# Patient Record
Sex: FEMALE | Race: BLACK OR AFRICAN AMERICAN | NOT HISPANIC OR LATINO | Employment: FULL TIME | URBAN - METROPOLITAN AREA
[De-identification: names, ages, dates, MRNs, and addresses within clinical notes are randomized per-mention and may not be internally consistent; named-entity substitution may affect disease eponyms.]

---

## 2023-05-05 DIAGNOSIS — T78.40XS ALLERGY, SEQUELA: ICD-10-CM

## 2023-05-05 RX ORDER — FLUTICASONE PROPIONATE 50 MCG
2 SPRAY, SUSPENSION (ML) NASAL DAILY
Qty: 16 G | Refills: 2 | Status: SHIPPED | OUTPATIENT
Start: 2023-05-05 | End: 2023-12-22 | Stop reason: SDUPTHER

## 2023-05-05 RX ORDER — ELECTROLYTES/DEXTROSE
SOLUTION, ORAL ORAL
COMMUNITY
End: 2023-12-22 | Stop reason: ALTCHOICE

## 2023-05-05 RX ORDER — MULTIVITAMIN
TABLET ORAL
COMMUNITY
End: 2023-12-22 | Stop reason: ALTCHOICE

## 2023-05-05 RX ORDER — CHLORPHENIRAMINE MALEATE 4 MG
1 TABLET ORAL NIGHTLY
COMMUNITY
Start: 2022-12-01 | End: 2023-12-22 | Stop reason: ALTCHOICE

## 2023-05-05 RX ORDER — FLUTICASONE PROPIONATE 50 MCG
2 SPRAY, SUSPENSION (ML) NASAL DAILY
COMMUNITY
Start: 2019-12-03 | End: 2023-05-05 | Stop reason: SDUPTHER

## 2023-05-05 RX ORDER — CALCIUM CARBONATE 600 MG
TABLET ORAL
COMMUNITY
End: 2023-12-22 | Stop reason: ALTCHOICE

## 2023-08-18 ENCOUNTER — TELEMEDICINE (OUTPATIENT)
Dept: PRIMARY CARE | Facility: CLINIC | Age: 68
End: 2023-08-18
Payer: COMMERCIAL

## 2023-08-18 DIAGNOSIS — J01.00 SUBACUTE MAXILLARY SINUSITIS: Primary | ICD-10-CM

## 2023-08-18 PROBLEM — M40.209 KYPHOSIS, ACQUIRED: Status: ACTIVE | Noted: 2023-08-18

## 2023-08-18 PROBLEM — F43.9 STRESS: Status: ACTIVE | Noted: 2023-08-18

## 2023-08-18 PROBLEM — K58.9 IRRITABLE BOWEL: Status: ACTIVE | Noted: 2023-08-18

## 2023-08-18 PROBLEM — J30.9 ALLERGIC RHINITIS WITH POSTNASAL DRIP: Status: ACTIVE | Noted: 2023-08-18

## 2023-08-18 PROBLEM — E78.00 ELEVATED LDL CHOLESTEROL LEVEL: Status: ACTIVE | Noted: 2023-08-18

## 2023-08-18 PROBLEM — R32 URINARY INCONTINENCE IN FEMALE: Status: ACTIVE | Noted: 2023-08-18

## 2023-08-18 PROBLEM — R09.82 ALLERGIC RHINITIS WITH POSTNASAL DRIP: Status: ACTIVE | Noted: 2023-08-18

## 2023-08-18 PROBLEM — R03.0 ELEVATED BP WITHOUT DIAGNOSIS OF HYPERTENSION: Status: ACTIVE | Noted: 2023-08-18

## 2023-08-18 PROCEDURE — 99442 PR PHYS/QHP TELEPHONE EVALUATION 11-20 MIN: CPT | Performed by: STUDENT IN AN ORGANIZED HEALTH CARE EDUCATION/TRAINING PROGRAM

## 2023-08-18 RX ORDER — GUAIFENESIN 400 MG/1
400 TABLET ORAL EVERY 4 HOURS PRN
Qty: 30 TABLET | Refills: 0 | Status: SHIPPED | OUTPATIENT
Start: 2023-08-18 | End: 2023-12-22 | Stop reason: ALTCHOICE

## 2023-08-18 RX ORDER — DOXYCYCLINE 100 MG/1
100 CAPSULE ORAL 2 TIMES DAILY
Qty: 14 CAPSULE | Refills: 0 | Status: SHIPPED | OUTPATIENT
Start: 2023-08-18 | End: 2023-08-25

## 2023-08-18 ASSESSMENT — ENCOUNTER SYMPTOMS
SINUS COMPLAINT: 1
SHORTNESS OF BREATH: 0
COUGH: 1
SINUS PRESSURE: 1

## 2023-08-18 NOTE — PROGRESS NOTES
An interactive audio telecommunications system which permits real-time communications between the patient (at the originating site) and provider (at the distant site) was utilized to provide this telehealth service.  Verbal consent was requested and obtained from the pt for a telehealth visit.    Subjective   Patient ID: Fanny Choudhary is a 67 y.o. female who presents for Nasal Congestion (Associated with rhinorrhea).  Sinus Problem  This is a new problem. The current episode started 1 to 4 weeks ago. The problem is unchanged. Associated symptoms include congestion, coughing, ear pain (left ear) and sinus pressure. Pertinent negatives include no shortness of breath. (Rhinorrhea) Past treatments include nasal decongestants (zyrtec).       Review of Systems   HENT:  Positive for congestion, ear pain (left ear) and sinus pressure.    Respiratory:  Positive for cough. Negative for shortness of breath.    All other systems reviewed and are negative.      There were no vitals taken for this visit.       Assessment/Plan   Problem List Items Addressed This Visit    None  Visit Diagnoses       Subacute maxillary sinusitis    -  Primary    Relevant Medications    doxycycline (Vibramycin) 100 mg capsule    guaiFENesin (Mucus Relief) 400 mg tablet

## 2023-12-22 ENCOUNTER — OFFICE VISIT (OUTPATIENT)
Dept: PRIMARY CARE | Facility: CLINIC | Age: 68
End: 2023-12-22
Payer: COMMERCIAL

## 2023-12-22 ENCOUNTER — LAB (OUTPATIENT)
Dept: LAB | Facility: LAB | Age: 68
End: 2023-12-22
Payer: COMMERCIAL

## 2023-12-22 VITALS
DIASTOLIC BLOOD PRESSURE: 78 MMHG | TEMPERATURE: 97.3 F | SYSTOLIC BLOOD PRESSURE: 114 MMHG | RESPIRATION RATE: 15 BRPM | BODY MASS INDEX: 26.87 KG/M2 | WEIGHT: 187.7 LBS | HEIGHT: 70 IN | HEART RATE: 56 BPM | OXYGEN SATURATION: 99 %

## 2023-12-22 DIAGNOSIS — H92.01 EARACHE ON RIGHT: ICD-10-CM

## 2023-12-22 DIAGNOSIS — R39.9 UTI SYMPTOMS: ICD-10-CM

## 2023-12-22 DIAGNOSIS — T78.40XS ALLERGY, SEQUELA: ICD-10-CM

## 2023-12-22 DIAGNOSIS — R39.9 UTI SYMPTOMS: Primary | ICD-10-CM

## 2023-12-22 PROBLEM — M41.20 IDIOPATHIC SCOLIOSIS AND KYPHOSCOLIOSIS: Status: ACTIVE | Noted: 2023-12-22

## 2023-12-22 LAB
ALBUMIN SERPL BCP-MCNC: 4.3 G/DL (ref 3.4–5)
ALP SERPL-CCNC: 72 U/L (ref 33–136)
ALT SERPL W P-5'-P-CCNC: 13 U/L (ref 7–45)
ANION GAP SERPL CALC-SCNC: 13 MMOL/L (ref 10–20)
APPEARANCE UR: CLEAR
AST SERPL W P-5'-P-CCNC: 17 U/L (ref 9–39)
BILIRUB SERPL-MCNC: 0.6 MG/DL (ref 0–1.2)
BILIRUB UR STRIP.AUTO-MCNC: NEGATIVE MG/DL
BUN SERPL-MCNC: 15 MG/DL (ref 6–23)
CALCIUM SERPL-MCNC: 9.4 MG/DL (ref 8.6–10.6)
CHLORIDE SERPL-SCNC: 106 MMOL/L (ref 98–107)
CO2 SERPL-SCNC: 27 MMOL/L (ref 21–32)
COLOR UR: YELLOW
CREAT SERPL-MCNC: 0.88 MG/DL (ref 0.5–1.05)
GFR SERPL CREATININE-BSD FRML MDRD: 73 ML/MIN/1.73M*2
GLUCOSE SERPL-MCNC: 98 MG/DL (ref 74–99)
GLUCOSE UR STRIP.AUTO-MCNC: NEGATIVE MG/DL
KETONES UR STRIP.AUTO-MCNC: NEGATIVE MG/DL
LEUKOCYTE ESTERASE UR QL STRIP.AUTO: NEGATIVE
NITRITE UR QL STRIP.AUTO: NEGATIVE
PH UR STRIP.AUTO: 6 [PH]
POTASSIUM SERPL-SCNC: 4.4 MMOL/L (ref 3.5–5.3)
PROT SERPL-MCNC: 7.2 G/DL (ref 6.4–8.2)
PROT UR STRIP.AUTO-MCNC: NEGATIVE MG/DL
RBC # UR STRIP.AUTO: NEGATIVE /UL
SODIUM SERPL-SCNC: 142 MMOL/L (ref 136–145)
SP GR UR STRIP.AUTO: 1.02
UROBILINOGEN UR STRIP.AUTO-MCNC: 2 MG/DL

## 2023-12-22 PROCEDURE — 99214 OFFICE O/P EST MOD 30 MIN: CPT | Performed by: STUDENT IN AN ORGANIZED HEALTH CARE EDUCATION/TRAINING PROGRAM

## 2023-12-22 PROCEDURE — 1160F RVW MEDS BY RX/DR IN RCRD: CPT | Performed by: STUDENT IN AN ORGANIZED HEALTH CARE EDUCATION/TRAINING PROGRAM

## 2023-12-22 PROCEDURE — 81003 URINALYSIS AUTO W/O SCOPE: CPT

## 2023-12-22 PROCEDURE — 1126F AMNT PAIN NOTED NONE PRSNT: CPT | Performed by: STUDENT IN AN ORGANIZED HEALTH CARE EDUCATION/TRAINING PROGRAM

## 2023-12-22 PROCEDURE — 36415 COLL VENOUS BLD VENIPUNCTURE: CPT

## 2023-12-22 PROCEDURE — 80053 COMPREHEN METABOLIC PANEL: CPT

## 2023-12-22 PROCEDURE — 1159F MED LIST DOCD IN RCRD: CPT | Performed by: STUDENT IN AN ORGANIZED HEALTH CARE EDUCATION/TRAINING PROGRAM

## 2023-12-22 PROCEDURE — 1036F TOBACCO NON-USER: CPT | Performed by: STUDENT IN AN ORGANIZED HEALTH CARE EDUCATION/TRAINING PROGRAM

## 2023-12-22 PROCEDURE — 87086 URINE CULTURE/COLONY COUNT: CPT

## 2023-12-22 RX ORDER — FLUTICASONE PROPIONATE 50 MCG
2 SPRAY, SUSPENSION (ML) NASAL DAILY
Qty: 16 G | Refills: 2 | Status: SHIPPED | OUTPATIENT
Start: 2023-12-22

## 2023-12-22 RX ORDER — SULFAMETHOXAZOLE AND TRIMETHOPRIM 800; 160 MG/1; MG/1
1 TABLET ORAL 2 TIMES DAILY
Qty: 14 TABLET | Refills: 0 | Status: SHIPPED | OUTPATIENT
Start: 2023-12-22 | End: 2023-12-29

## 2023-12-22 ASSESSMENT — LIFESTYLE VARIABLES
HOW OFTEN DO YOU HAVE SIX OR MORE DRINKS ON ONE OCCASION: NEVER
HOW OFTEN DO YOU HAVE A DRINK CONTAINING ALCOHOL: NEVER
SKIP TO QUESTIONS 9-10: 1
HOW MANY STANDARD DRINKS CONTAINING ALCOHOL DO YOU HAVE ON A TYPICAL DAY: PATIENT DOES NOT DRINK
AUDIT-C TOTAL SCORE: 0

## 2023-12-22 ASSESSMENT — ENCOUNTER SYMPTOMS
RHINORRHEA: 1
HEMATURIA: 0
FREQUENCY: 1
FLANK PAIN: 0
CHILLS: 0
NAUSEA: 0
VOMITING: 0
COUGH: 0
HEADACHES: 0

## 2023-12-22 ASSESSMENT — PATIENT HEALTH QUESTIONNAIRE - PHQ9
1. LITTLE INTEREST OR PLEASURE IN DOING THINGS: NOT AT ALL
2. FEELING DOWN, DEPRESSED OR HOPELESS: NOT AT ALL
SUM OF ALL RESPONSES TO PHQ9 QUESTIONS 1 & 2: 0

## 2023-12-22 NOTE — PATIENT INSTRUCTIONS
Bactrim twice daily for 7 days  Blood and urine work ordered  We discussed about using Debrox to clean the earwax and reevaluating the ear for possible ear infection.  Use the Flonase more consistently  You can also use Zyrtec or Claritin for your congestion.  Follow-up with me in 4 to 6 months or as needed

## 2023-12-22 NOTE — PROGRESS NOTES
Subjective   Patient ID: Fanny Choudhary is a pleasant 68 y.o. female who presents for right ear pain.  Earache   There is pain in the right ear. This is a new problem. The current episode started 1 to 4 weeks ago. The problem has been unchanged. There has been no fever. Associated symptoms include rhinorrhea. Pertinent negatives include no coughing, ear discharge, headaches, hearing loss or vomiting. She has tried nothing for the symptoms.   UTI   This is a new problem. The current episode started 1 to 4 weeks ago (2 weeks). The problem occurs every urination. Associated symptoms include frequency. Pertinent negatives include no chills, flank pain, hematuria, hesitancy, nausea, urgency or vomiting. Associated symptoms comments: Strong odor . She has tried nothing for the symptoms. There is no history of recurrent UTIs.       Review of Systems   Constitutional:  Negative for chills.   HENT:  Positive for ear pain and rhinorrhea. Negative for ear discharge and hearing loss.    Respiratory:  Negative for cough.    Gastrointestinal:  Negative for nausea and vomiting.   Genitourinary:  Positive for frequency. Negative for flank pain, hematuria, hesitancy and urgency.   Neurological:  Negative for headaches.   All other systems reviewed and are negative.      Visit Vitals  /78   Pulse 56   Temp 36.3 °C (97.3 °F)   Resp 15          Objective   Physical Exam  Constitutional:       General: She is not in acute distress.     Appearance: Normal appearance.   HENT:      Head: Normocephalic and atraumatic.      Right Ear: There is impacted cerumen.      Left Ear: There is impacted cerumen.   Eyes:      General: No scleral icterus.     Conjunctiva/sclera: Conjunctivae normal.   Cardiovascular:      Rate and Rhythm: Normal rate and regular rhythm.      Heart sounds: Normal heart sounds.   Pulmonary:      Effort: Pulmonary effort is normal.      Breath sounds: Normal breath sounds. No wheezing.   Abdominal:      General:  Bowel sounds are normal. There is no distension.      Palpations: Abdomen is soft.      Tenderness: There is no abdominal tenderness.   Musculoskeletal:      Cervical back: Neck supple.      Right lower leg: No edema.      Left lower leg: No edema.   Lymphadenopathy:      Cervical: No cervical adenopathy.   Skin:     General: Skin is warm and dry.   Neurological:      General: No focal deficit present.      Mental Status: She is alert and oriented to person, place, and time.   Psychiatric:         Mood and Affect: Mood normal.         Behavior: Behavior normal.         Assessment/Plan   Problem List Items Addressed This Visit    None  Visit Diagnoses       UTI symptoms    -  Primary    Relevant Medications    sulfamethoxazole-trimethoprim (Bactrim DS) 800-160 mg tablet    Other Relevant Orders    Urinalysis with Reflex Microscopic    Urine Culture    Comprehensive Metabolic Panel    Earache on right      Unable to determine if there is a otitis media due to cerumen impaction.  She will use [facility]x [facility] and to be further evaluated.    Allergy, sequela        Relevant Medications    fluticasone (Flonase) 50 mcg/actuation nasal spray

## 2023-12-23 LAB — BACTERIA UR CULT: NO GROWTH

## 2024-03-15 ENCOUNTER — LAB (OUTPATIENT)
Dept: LAB | Facility: LAB | Age: 69
End: 2024-03-15
Payer: COMMERCIAL

## 2024-03-15 ENCOUNTER — OFFICE VISIT (OUTPATIENT)
Dept: PRIMARY CARE | Facility: CLINIC | Age: 69
End: 2024-03-15
Payer: COMMERCIAL

## 2024-03-15 VITALS
DIASTOLIC BLOOD PRESSURE: 78 MMHG | WEIGHT: 178.5 LBS | SYSTOLIC BLOOD PRESSURE: 122 MMHG | BODY MASS INDEX: 25.56 KG/M2 | OXYGEN SATURATION: 99 % | HEIGHT: 70 IN | RESPIRATION RATE: 14 BRPM | HEART RATE: 60 BPM | TEMPERATURE: 96.8 F

## 2024-03-15 DIAGNOSIS — Z13.1 DIABETES MELLITUS SCREENING: ICD-10-CM

## 2024-03-15 DIAGNOSIS — E78.5 HYPERLIPIDEMIA, UNSPECIFIED HYPERLIPIDEMIA TYPE: ICD-10-CM

## 2024-03-15 DIAGNOSIS — H53.9 VISION CHANGES: ICD-10-CM

## 2024-03-15 DIAGNOSIS — Z23 NEED FOR SHINGLES VACCINE: ICD-10-CM

## 2024-03-15 DIAGNOSIS — L65.9 HAIR LOSS: ICD-10-CM

## 2024-03-15 DIAGNOSIS — H61.23 BILATERAL IMPACTED CERUMEN: ICD-10-CM

## 2024-03-15 DIAGNOSIS — Z12.31 BREAST CANCER SCREENING BY MAMMOGRAM: ICD-10-CM

## 2024-03-15 DIAGNOSIS — Z00.00 ANNUAL PHYSICAL EXAM: Primary | ICD-10-CM

## 2024-03-15 LAB
ALBUMIN SERPL BCP-MCNC: 4.2 G/DL (ref 3.4–5)
ALP SERPL-CCNC: 83 U/L (ref 33–136)
ALT SERPL W P-5'-P-CCNC: 16 U/L (ref 7–45)
ANION GAP SERPL CALC-SCNC: 10 MMOL/L (ref 10–20)
APPEARANCE UR: CLEAR
AST SERPL W P-5'-P-CCNC: 16 U/L (ref 9–39)
BILIRUB SERPL-MCNC: 0.5 MG/DL (ref 0–1.2)
BILIRUB UR STRIP.AUTO-MCNC: NEGATIVE MG/DL
BUN SERPL-MCNC: 16 MG/DL (ref 6–23)
CALCIUM SERPL-MCNC: 9.4 MG/DL (ref 8.6–10.6)
CHLORIDE SERPL-SCNC: 105 MMOL/L (ref 98–107)
CHOLEST SERPL-MCNC: 263 MG/DL (ref 0–199)
CHOLESTEROL/HDL RATIO: 2.9
CO2 SERPL-SCNC: 30 MMOL/L (ref 21–32)
COLOR UR: YELLOW
CREAT SERPL-MCNC: 0.94 MG/DL (ref 0.5–1.05)
EGFRCR SERPLBLD CKD-EPI 2021: 67 ML/MIN/1.73M*2
EST. AVERAGE GLUCOSE BLD GHB EST-MCNC: 114 MG/DL
GLUCOSE SERPL-MCNC: 87 MG/DL (ref 74–99)
GLUCOSE UR STRIP.AUTO-MCNC: NORMAL MG/DL
HBA1C MFR BLD: 5.6 %
HDLC SERPL-MCNC: 91.1 MG/DL
KETONES UR STRIP.AUTO-MCNC: NEGATIVE MG/DL
LDLC SERPL CALC-MCNC: 160 MG/DL
LEUKOCYTE ESTERASE UR QL STRIP.AUTO: NEGATIVE
NITRITE UR QL STRIP.AUTO: NEGATIVE
NON HDL CHOLESTEROL: 172 MG/DL (ref 0–149)
PH UR STRIP.AUTO: 6.5 [PH]
POTASSIUM SERPL-SCNC: 4.3 MMOL/L (ref 3.5–5.3)
PROT SERPL-MCNC: 6.8 G/DL (ref 6.4–8.2)
PROT UR STRIP.AUTO-MCNC: NORMAL MG/DL
RBC # UR STRIP.AUTO: NEGATIVE /UL
RBC #/AREA URNS AUTO: NORMAL /HPF
SODIUM SERPL-SCNC: 141 MMOL/L (ref 136–145)
SP GR UR STRIP.AUTO: 1.03
TRIGL SERPL-MCNC: 58 MG/DL (ref 0–149)
UROBILINOGEN UR STRIP.AUTO-MCNC: NORMAL MG/DL
VLDL: 12 MG/DL (ref 0–40)
WBC #/AREA URNS AUTO: NORMAL /HPF

## 2024-03-15 PROCEDURE — 90750 HZV VACC RECOMBINANT IM: CPT | Performed by: STUDENT IN AN ORGANIZED HEALTH CARE EDUCATION/TRAINING PROGRAM

## 2024-03-15 PROCEDURE — 80061 LIPID PANEL: CPT

## 2024-03-15 PROCEDURE — 1036F TOBACCO NON-USER: CPT | Performed by: STUDENT IN AN ORGANIZED HEALTH CARE EDUCATION/TRAINING PROGRAM

## 2024-03-15 PROCEDURE — 83036 HEMOGLOBIN GLYCOSYLATED A1C: CPT

## 2024-03-15 PROCEDURE — 81001 URINALYSIS AUTO W/SCOPE: CPT

## 2024-03-15 PROCEDURE — 36415 COLL VENOUS BLD VENIPUNCTURE: CPT

## 2024-03-15 PROCEDURE — 99397 PER PM REEVAL EST PAT 65+ YR: CPT | Performed by: STUDENT IN AN ORGANIZED HEALTH CARE EDUCATION/TRAINING PROGRAM

## 2024-03-15 PROCEDURE — 1159F MED LIST DOCD IN RCRD: CPT | Performed by: STUDENT IN AN ORGANIZED HEALTH CARE EDUCATION/TRAINING PROGRAM

## 2024-03-15 PROCEDURE — 90471 IMMUNIZATION ADMIN: CPT | Performed by: STUDENT IN AN ORGANIZED HEALTH CARE EDUCATION/TRAINING PROGRAM

## 2024-03-15 PROCEDURE — 1160F RVW MEDS BY RX/DR IN RCRD: CPT | Performed by: STUDENT IN AN ORGANIZED HEALTH CARE EDUCATION/TRAINING PROGRAM

## 2024-03-15 PROCEDURE — 80053 COMPREHEN METABOLIC PANEL: CPT

## 2024-03-15 ASSESSMENT — PATIENT HEALTH QUESTIONNAIRE - PHQ9
SUM OF ALL RESPONSES TO PHQ9 QUESTIONS 1 AND 2: 0
1. LITTLE INTEREST OR PLEASURE IN DOING THINGS: NOT AT ALL
2. FEELING DOWN, DEPRESSED OR HOPELESS: NOT AT ALL

## 2024-03-15 NOTE — PROGRESS NOTES
Subjective   Patient ID: Fanny Choudhary is a 68 y.o. female who presents for Annual Exam (physical).  HPI    Health Maintenance:  -   Colonoscopy: 2022, repeat in 5 years   -   Mammogram: 4/13/23  -   PAP: 2021  -   Bone density DEXA: 5/23, osteopenia     Immunizations:  - COVID vaccination status:  - Influenza:  - Shingles: due   - TDAP: 2017  - Pneumo Vaccine: declined     She is due for annual eye exam   UTD w dental exam  She is still working and taking care of 2 family members. She is physically active.   She continues to have itching in the right ear.  Noted to have bilateral cerumen impaction.      Review of Systems   All other systems reviewed and are negative.      Visit Vitals  /78 (Patient Position: Sitting)   Pulse 60   Temp 36 °C (96.8 °F)   Resp 14          Objective   Physical Exam  Constitutional:       General: She is not in acute distress.     Appearance: Normal appearance.   HENT:      Head: Normocephalic and atraumatic.      Right Ear: There is impacted cerumen.      Left Ear: There is impacted cerumen.   Eyes:      General: No scleral icterus.     Conjunctiva/sclera: Conjunctivae normal.   Cardiovascular:      Rate and Rhythm: Normal rate and regular rhythm.      Heart sounds: Normal heart sounds.   Pulmonary:      Effort: Pulmonary effort is normal.      Breath sounds: Normal breath sounds. No wheezing.   Abdominal:      General: Bowel sounds are normal. There is no distension.      Palpations: Abdomen is soft.      Tenderness: There is no abdominal tenderness.   Musculoskeletal:      Cervical back: Neck supple.      Right lower leg: No edema.      Left lower leg: No edema.   Lymphadenopathy:      Cervical: No cervical adenopathy.   Skin:     General: Skin is warm and dry.   Neurological:      General: No focal deficit present.      Mental Status: She is alert and oriented to person, place, and time.   Psychiatric:         Mood and Affect: Mood normal.         Behavior: Behavior  normal.         Assessment/Plan   Problem List Items Addressed This Visit    None  Visit Diagnoses       Annual physical exam    -  Primary    Breast cancer screening by mammogram        Relevant Orders    BI mammo bilateral screening tomosynthesis    Hyperlipidemia, unspecified hyperlipidemia type        Relevant Orders    Lipid Panel    Comprehensive Metabolic Panel    Need for shingles vaccine        Relevant Orders    Zoster vaccine, recombinant, adult (SHINGRIX) (Completed)    Vision changes        Relevant Orders    Referral to Ophthalmology    Hair loss        Relevant Orders    Referral to Dermatology    Bilateral impacted cerumen        Relevant Orders    Referral to ENT    Diabetes mellitus screening        Relevant Orders    Hemoglobin A1C    Urinalysis with Reflex Microscopic

## 2024-03-15 NOTE — PATIENT INSTRUCTIONS
Schedule your eye exam   Shingle vaccine today   Schedule your mammogram next month   Continue with current medications.  Blood work before your next visit.  If you receive medical information from [facility], your results will be released into your online chart. This means you may view or see results before someone from our office contact you directly.  Please keep in mind that if blood work or imaging were ordered during your visit, all the nonurgent lab results will be discussed with you at your next office visit.  Please arrive 15 minutes before your appointment.   Follow-up with primary care in 12 months or as needed

## 2024-11-08 ENCOUNTER — OFFICE VISIT (OUTPATIENT)
Dept: URGENT CARE | Age: 69
End: 2024-11-08
Payer: COMMERCIAL

## 2024-11-08 VITALS
HEART RATE: 82 BPM | OXYGEN SATURATION: 99 % | DIASTOLIC BLOOD PRESSURE: 76 MMHG | WEIGHT: 188 LBS | TEMPERATURE: 98.2 F | BODY MASS INDEX: 27.36 KG/M2 | SYSTOLIC BLOOD PRESSURE: 110 MMHG | RESPIRATION RATE: 18 BRPM

## 2024-11-08 DIAGNOSIS — R05.1 ACUTE COUGH: Primary | ICD-10-CM

## 2024-11-08 DIAGNOSIS — M54.6 ACUTE RIGHT-SIDED THORACIC BACK PAIN: ICD-10-CM

## 2024-11-08 ASSESSMENT — ENCOUNTER SYMPTOMS: BACK PAIN: 1

## 2024-11-08 ASSESSMENT — PAIN SCALES - GENERAL: PAINLEVEL_OUTOF10: 8

## 2024-11-09 ENCOUNTER — ANCILLARY PROCEDURE (OUTPATIENT)
Dept: URGENT CARE | Age: 69
End: 2024-11-09
Payer: COMMERCIAL

## 2024-11-09 ENCOUNTER — TELEPHONE (OUTPATIENT)
Dept: URGENT CARE | Age: 69
End: 2024-11-09

## 2024-11-09 DIAGNOSIS — M54.6 ACUTE RIGHT-SIDED THORACIC BACK PAIN: ICD-10-CM

## 2024-11-09 DIAGNOSIS — R05.1 ACUTE COUGH: ICD-10-CM

## 2024-11-09 PROCEDURE — 71046 X-RAY EXAM CHEST 2 VIEWS: CPT | Performed by: PHYSICIAN ASSISTANT

## 2024-11-09 NOTE — PROGRESS NOTES
Subjective   Patient ID: Fanny Choudhary is a 69 y.o. female. They present today with a chief complaint of Severe pain and Back Pain.    History of Present Illness    Back Pain      69-year-old female with no significant past medical history presents to urgent care with her  for evaluation of right sided thoracic back pain.  Patient denies any specific injuries but reports the pain began shortly after picking up her briefcase earlier today.  Pain is worse with range of motion of her right shoulder as well as with bending and twisting activities.  Reports shortness of breath when the pain occurs but otherwise she has no shortness of breath.  No fevers but she does report that a cough developed today.  No sore throat or ear pain.  She does report multiple contacts at work that are sick.  She has taken over-the-counter Motrin as well as turmeric and curcumin with mild temporary relief in her pain.  She denies any associated chest pain.  No swelling in her legs.  Denies history of blood clots.  No recent immobilization.  Patient does not take any estrogen containing products.  No hemoptysis.  Past Medical History  Allergies as of 11/08/2024 - Reviewed 11/08/2024   Allergen Reaction Noted    Animal dander Unknown 12/22/2023    Bee pollen Unknown 12/22/2023    House dust Unknown 12/22/2023    Penicillins Unknown 08/18/2023       (Not in a hospital admission)       Past Medical History:   Diagnosis Date    Allergic rhinitis due to pollen     Allergic rhinitis due to pollen    Benign neoplasm of colon, unspecified 11/07/2017    Tubular adenoma of colon    Chondrocostal junction syndrome (tietze) 02/04/2016    Costochondritis    Concussion with loss of consciousness status unknown, initial encounter 01/07/2020    Concussion    Contusion of unspecified part of head, initial encounter 01/14/2015    Contusion of head    Encounter for gynecological examination (general) (routine) without abnormal findings 12/07/2017     Encounter for annual routine gynecological examination    Endometriosis, unspecified     Endometriosis    Generalized abdominal pain 12/22/2016    Abdominal pain, diffuse    Other chest pain 04/02/2018    Chest pain, atypical    Other conditions influencing health status     Mammogram    Other conditions influencing health status 03/24/2017    History of cough    Pain in left shoulder 02/04/2016    Left shoulder pain    Personal history of other diseases of the female genital tract 01/14/2015    History of breast pain    Personal history of other diseases of the nervous system and sense organs 07/13/2015    History of otitis media    Personal history of other diseases of the nervous system and sense organs 03/17/2015    History of acute otitis media    Personal history of other diseases of the respiratory system     History of extrinsic asthma    Personal history of other diseases of the respiratory system     History of bronchitis    Personal history of other diseases of the respiratory system     History of chronic sinusitis    Personal history of other specified conditions 12/08/2016    History of dysuria    Personal history of other specified conditions 09/25/2017    History of abdominal pain    Personal history of pneumonia (recurrent)     History of pneumonia    Unspecified acute noninfective otitis externa, right ear 06/14/2016    Acute otitis externa of right ear, unspecified type    Unspecified fracture of unspecified toe(s), initial encounter for closed fracture 06/19/2014    Closed fracture of phalanx of foot    Unspecified injury of unspecified foot, initial encounter 03/27/2014    Foot trauma       Past Surgical History:   Procedure Laterality Date    COLONOSCOPY  03/03/2014    Complete Colonoscopy    OTHER SURGICAL HISTORY  07/13/2015    Laparoscopy Of Uterus        reports that she has never smoked. She has never used smokeless tobacco. She reports that she does not drink alcohol and does not use  drugs.    Review of Systems  Review of Systems   Musculoskeletal:  Positive for back pain.   Allergic/Immunologic: Negative for immunocompromised state.   All other systems reviewed and are negative.                                 Objective    Vitals:    11/08/24 1916   BP: 110/76   BP Location: Left arm   Patient Position: Sitting   Pulse: 82   Resp: 18   Temp: 36.8 °C (98.2 °F)   TempSrc: Oral   SpO2: 99%   Weight: 85.3 kg (188 lb)     No LMP recorded.    Physical Exam  Vitals reviewed.   Constitutional:       Appearance: Normal appearance.   Cardiovascular:      Rate and Rhythm: Normal rate and regular rhythm.      Heart sounds: Normal heart sounds.   Pulmonary:      Effort: Pulmonary effort is normal.      Breath sounds: Normal breath sounds.   Musculoskeletal:      Cervical back: Normal.      Thoracic back: Tenderness (Right side of the thoracic back around the scapula.) present.      Comments: No bruises, abrasions, rashes, or other overlying skin abnormalities.   Skin:     General: Skin is warm and dry.   Neurological:      Mental Status: She is alert and oriented to person, place, and time.   Psychiatric:         Mood and Affect: Mood normal.         Behavior: Behavior normal.         Procedures    Point of Care Test & Imaging Results from this visit  No results found for this visit on 11/08/24.   No results found.    Diagnostic study results (if any) were reviewed by Paras Beltran PA-C.    Assessment/Plan   Allergies, medications, history, and pertinent labs/EKGs/Imaging reviewed by Paras Beltran PA-C.     Medical Decision Making  Patient is afebrile with normal vital signs.  She is in no acute distress.  Physical exam is benign aside from reproducible tenderness around the right scapula.  She has no overlying skin changes.  No hypoxia or tachycardia.  She only has shortness of breath with episodes of pain which are elicited with palpation or twisting or bending activities.  I have low suspicion for PE  at this time although I did inform patient that if she has any worsening symptoms she needs to go to the ED to be evaluated for PE.  Lungs are clear to auscultation although she does report that she developed a mild cough today.  Chest x-ray is ordered and she will return to clinic tomorrow for imaging.  In the meantime she will use over-the-counter pain medications.  I offered to send in prescriptions for pain medicine but she declined.    Orders and Diagnoses  Diagnoses and all orders for this visit:  Acute cough  -     XR chest 2 views; Future  Acute right-sided thoracic back pain  -     XR chest 2 views; Future      Medical Admin Record      Patient disposition: Home    Electronically signed by Paras Beltran PA-C  7:23 PM

## 2024-11-09 NOTE — PROGRESS NOTES
11/09/24    Fanny Choudhary  YOB: 1955     To Whom It May Concern:    Fanny Choudhary was seen in my clinic on 11/9/2024 at 8:30 am. Please excuse Fanny for her absence from school on this day to make the appointment.    If you have any questions or concerns, please don't hesitate to call.    According to the [employer] guidelines, endocarditis prophylaxis at times of increased risks is not indicated.     [unfilled]    Sincerely,         [facility]        CC: [unfilled]

## 2024-12-13 ENCOUNTER — HOSPITAL ENCOUNTER (OUTPATIENT)
Dept: RADIOLOGY | Facility: CLINIC | Age: 69
Discharge: HOME | End: 2024-12-13
Payer: COMMERCIAL

## 2024-12-13 DIAGNOSIS — Z12.31 BREAST CANCER SCREENING BY MAMMOGRAM: ICD-10-CM

## 2024-12-13 PROCEDURE — 77067 SCR MAMMO BI INCL CAD: CPT

## 2025-01-27 ENCOUNTER — ANCILLARY PROCEDURE (OUTPATIENT)
Dept: URGENT CARE | Age: 70
End: 2025-01-27
Payer: COMMERCIAL

## 2025-01-27 ENCOUNTER — OFFICE VISIT (OUTPATIENT)
Dept: URGENT CARE | Age: 70
End: 2025-01-27
Payer: COMMERCIAL

## 2025-01-27 VITALS
SYSTOLIC BLOOD PRESSURE: 158 MMHG | HEART RATE: 61 BPM | BODY MASS INDEX: 27.22 KG/M2 | OXYGEN SATURATION: 95 % | DIASTOLIC BLOOD PRESSURE: 112 MMHG | WEIGHT: 187 LBS

## 2025-01-27 DIAGNOSIS — R09.89 CHEST CONGESTION: ICD-10-CM

## 2025-01-27 DIAGNOSIS — R05.1 ACUTE COUGH: Primary | ICD-10-CM

## 2025-01-27 DIAGNOSIS — R03.0 ELEVATED BP WITHOUT DIAGNOSIS OF HYPERTENSION: ICD-10-CM

## 2025-01-27 DIAGNOSIS — R05.1 ACUTE COUGH: ICD-10-CM

## 2025-01-27 PROCEDURE — 1036F TOBACCO NON-USER: CPT | Performed by: PHYSICIAN ASSISTANT

## 2025-01-27 PROCEDURE — 1159F MED LIST DOCD IN RCRD: CPT | Performed by: PHYSICIAN ASSISTANT

## 2025-01-27 PROCEDURE — 99214 OFFICE O/P EST MOD 30 MIN: CPT | Performed by: PHYSICIAN ASSISTANT

## 2025-01-27 PROCEDURE — 71046 X-RAY EXAM CHEST 2 VIEWS: CPT | Performed by: PHYSICIAN ASSISTANT

## 2025-01-27 RX ORDER — AZITHROMYCIN 250 MG/1
250 TABLET, FILM COATED ORAL DAILY
Qty: 6 TABLET | Refills: 0 | Status: SHIPPED | OUTPATIENT
Start: 2025-01-27 | End: 2025-02-02

## 2025-01-27 ASSESSMENT — ENCOUNTER SYMPTOMS: COUGH: 1

## 2025-01-27 NOTE — PROGRESS NOTES
Subjective   Patient ID: Fanny Choudhary is a 69 y.o. female. They present today with a chief complaint of Cough (Clear phlegm, x3wks). Taking otc meds without improvement.  Patient reports that she is not coughing anything up.  Denies chest pain, shortness of breath, nausea, vomiting, lightness, dizziness, palpitations, syncope.        Past Medical History  Allergies as of 01/27/2025 - Reviewed 01/27/2025   Allergen Reaction Noted    Animal dander Unknown 12/22/2023    Bee pollen Unknown 12/22/2023    House dust Unknown 12/22/2023    Penicillins Unknown 08/18/2023       (Not in a hospital admission)       Past Medical History:   Diagnosis Date    Allergic rhinitis due to pollen     Allergic rhinitis due to pollen    Benign neoplasm of colon, unspecified 11/07/2017    Tubular adenoma of colon    Chondrocostal junction syndrome (tietze) 02/04/2016    Costochondritis    Concussion with loss of consciousness status unknown, initial encounter 01/07/2020    Concussion    Contusion of unspecified part of head, initial encounter 01/14/2015    Contusion of head    Encounter for gynecological examination (general) (routine) without abnormal findings 12/07/2017    Encounter for annual routine gynecological examination    Endometriosis, unspecified     Endometriosis    Generalized abdominal pain 12/22/2016    Abdominal pain, diffuse    Other chest pain 04/02/2018    Chest pain, atypical    Other conditions influencing health status     Mammogram    Other conditions influencing health status 03/24/2017    History of cough    Pain in left shoulder 02/04/2016    Left shoulder pain    Personal history of other diseases of the female genital tract 01/14/2015    History of breast pain    Personal history of other diseases of the nervous system and sense organs 07/13/2015    History of otitis media    Personal history of other diseases of the nervous system and sense organs 03/17/2015    History of acute otitis media    Personal  history of other diseases of the respiratory system     History of extrinsic asthma    Personal history of other diseases of the respiratory system     History of bronchitis    Personal history of other diseases of the respiratory system     History of chronic sinusitis    Personal history of other specified conditions 12/08/2016    History of dysuria    Personal history of other specified conditions 09/25/2017    History of abdominal pain    Personal history of pneumonia (recurrent)     History of pneumonia    Unspecified acute noninfective otitis externa, right ear 06/14/2016    Acute otitis externa of right ear, unspecified type    Unspecified fracture of unspecified toe(s), initial encounter for closed fracture 06/19/2014    Closed fracture of phalanx of foot    Unspecified injury of unspecified foot, initial encounter 03/27/2014    Foot trauma       Past Surgical History:   Procedure Laterality Date    COLONOSCOPY  03/03/2014    Complete Colonoscopy    OTHER SURGICAL HISTORY  07/13/2015    Laparoscopy Of Uterus        reports that she has never smoked. She has never used smokeless tobacco. She reports that she does not drink alcohol and does not use drugs.    Review of Systems  Review of Systems   Respiratory:  Positive for cough.    All other systems reviewed and are negative.                                 Objective    Vitals:    01/27/25 1833   BP: (!) 158/112   Pulse: 61   SpO2: 95%   Weight: 84.8 kg (187 lb)     No LMP recorded. Patient is postmenopausal.    Physical Exam  Vitals and nursing note reviewed.   Constitutional:       Appearance: Normal appearance.   Eyes:      Conjunctiva/sclera: Conjunctivae normal.   Cardiovascular:      Rate and Rhythm: Normal rate.   Pulmonary:      Effort: Pulmonary effort is normal.   Neurological:      Mental Status: She is alert.   Psychiatric:         Mood and Affect: Mood normal.         Procedures    Point of Care Test & Imaging Results from this visit  No results  found for this visit on 01/27/25.   No results found.    Diagnostic study results (if any) were reviewed by Brandon Mac PA-C.    Assessment/Plan   Allergies, medications, history, and pertinent labs/EKGs/Imaging reviewed by Brandon Mac PA-C.     Medical Decision Making  Cough for several weeks without improvement with over-the-counter measures.  Feels like she has chest congestion.  CXR without consolidation, mass, effusion  Continue supportive measures, follow up with pcp  Azithromycin if develop fever.   Orders and Diagnoses  Diagnoses and all orders for this visit:  Acute cough  -     XR chest 2 views; Future  Chest congestion  -     XR chest 2 views; Future  Elevated BP without diagnosis of hypertension      Medical Admin Record      Patient disposition: Home    Electronically signed by Brandon Mac PA-C  6:42 PM

## 2025-02-03 ENCOUNTER — OFFICE VISIT (OUTPATIENT)
Dept: URGENT CARE | Age: 70
End: 2025-02-03
Payer: COMMERCIAL

## 2025-02-03 VITALS
SYSTOLIC BLOOD PRESSURE: 150 MMHG | TEMPERATURE: 98.7 F | OXYGEN SATURATION: 97 % | DIASTOLIC BLOOD PRESSURE: 87 MMHG | HEART RATE: 71 BPM

## 2025-02-03 DIAGNOSIS — J01.00 ACUTE NON-RECURRENT MAXILLARY SINUSITIS: Primary | ICD-10-CM

## 2025-02-03 RX ORDER — AMOXICILLIN AND CLAVULANATE POTASSIUM 875; 125 MG/1; MG/1
1 TABLET, FILM COATED ORAL 2 TIMES DAILY
Qty: 14 TABLET | Refills: 0 | Status: SHIPPED | OUTPATIENT
Start: 2025-02-03 | End: 2025-02-10

## 2025-02-03 ASSESSMENT — ENCOUNTER SYMPTOMS
SINUS PAIN: 1
SINUS PRESSURE: 1
HEADACHES: 1

## 2025-02-03 NOTE — PROGRESS NOTES
"Subjective   Patient ID: Fanny Choudhary is a 69 y.o. female. They present today with a chief complaint of Sinusitis (Pt. Was seen in clinic 1 week ago, post nasal drip, chest congestion with yellow mucus. Sinus headache x 3 days. ).    History of Present Illness  Patient is a 69-year-old female with no pertinent past medical history presents urgent care today with a complaint of ongoing, worsening sinus pain/pressure and nasal congestion.  She states she was seen here a week ago and started on a Z-Aden for possible sinus infection.  She took the medication as directed but states her symptoms have not improved and in fact are worse.  She states originally her drainage was clear but it is now yellowish/greenish.  She also now has a \"sinus headache\" which she also did not have previously.  Chest x-ray from a week ago was negative.  She has been using over-the-counter medication as well without significant relief.  She denies any chest pain or shortness of breath.  No other complaints or concerns mention this time.      History provided by:  Patient  Sinusitis  Associated symptoms: congestion and headaches        Past Medical History  Allergies as of 02/03/2025 - Reviewed 02/03/2025   Allergen Reaction Noted    Animal dander Unknown 12/22/2023    Bee pollen Unknown 12/22/2023    House dust Unknown 12/22/2023    Penicillins Unknown 08/18/2023       (Not in a hospital admission)         Past Medical History:   Diagnosis Date    Allergic rhinitis due to pollen     Allergic rhinitis due to pollen    Benign neoplasm of colon, unspecified 11/07/2017    Tubular adenoma of colon    Chondrocostal junction syndrome (tietze) 02/04/2016    Costochondritis    Concussion with loss of consciousness status unknown, initial encounter 01/07/2020    Concussion    Contusion of unspecified part of head, initial encounter 01/14/2015    Contusion of head    Encounter for gynecological examination (general) (routine) without abnormal " findings 12/07/2017    Encounter for annual routine gynecological examination    Endometriosis, unspecified     Endometriosis    Generalized abdominal pain 12/22/2016    Abdominal pain, diffuse    Other chest pain 04/02/2018    Chest pain, atypical    Other conditions influencing health status     Mammogram    Other conditions influencing health status 03/24/2017    History of cough    Pain in left shoulder 02/04/2016    Left shoulder pain    Personal history of other diseases of the female genital tract 01/14/2015    History of breast pain    Personal history of other diseases of the nervous system and sense organs 07/13/2015    History of otitis media    Personal history of other diseases of the nervous system and sense organs 03/17/2015    History of acute otitis media    Personal history of other diseases of the respiratory system     History of extrinsic asthma    Personal history of other diseases of the respiratory system     History of bronchitis    Personal history of other diseases of the respiratory system     History of chronic sinusitis    Personal history of other specified conditions 12/08/2016    History of dysuria    Personal history of other specified conditions 09/25/2017    History of abdominal pain    Personal history of pneumonia (recurrent)     History of pneumonia    Unspecified acute noninfective otitis externa, right ear 06/14/2016    Acute otitis externa of right ear, unspecified type    Unspecified fracture of unspecified toe(s), initial encounter for closed fracture 06/19/2014    Closed fracture of phalanx of foot    Unspecified injury of unspecified foot, initial encounter 03/27/2014    Foot trauma       Past Surgical History:   Procedure Laterality Date    COLONOSCOPY  03/03/2014    Complete Colonoscopy    OTHER SURGICAL HISTORY  07/13/2015    Laparoscopy Of Uterus        reports that she has never smoked. She has never used smokeless tobacco. She reports that she does not drink  alcohol and does not use drugs.    Review of Systems  Review of Systems   HENT:  Positive for congestion, sinus pressure and sinus pain.    Neurological:  Positive for headaches.                                  Objective    Vitals:    02/03/25 1030   BP: 150/87   BP Location: Left arm   Patient Position: Sitting   BP Cuff Size: Large adult   Pulse: 71   Temp: 37.1 °C (98.7 °F)   TempSrc: Oral   SpO2: 97%     No LMP recorded. Patient is postmenopausal.    Physical Exam  Vitals and nursing note reviewed.   Constitutional:       General: She is not in acute distress.     Appearance: Normal appearance. She is not ill-appearing, toxic-appearing or diaphoretic.   HENT:      Head: Normocephalic and atraumatic.      Right Ear: Tympanic membrane, ear canal and external ear normal.      Left Ear: Tympanic membrane, ear canal and external ear normal.      Nose: Congestion present.      Right Sinus: Maxillary sinus tenderness present.      Left Sinus: Maxillary sinus tenderness present.      Mouth/Throat:      Mouth: Mucous membranes are moist.      Pharynx: Posterior oropharyngeal erythema present. No oropharyngeal exudate.   Eyes:      Extraocular Movements: Extraocular movements intact.      Conjunctiva/sclera: Conjunctivae normal.      Pupils: Pupils are equal, round, and reactive to light.   Cardiovascular:      Rate and Rhythm: Normal rate and regular rhythm.      Pulses: Normal pulses.      Heart sounds: Normal heart sounds.   Pulmonary:      Effort: Pulmonary effort is normal. No respiratory distress.      Breath sounds: Normal breath sounds. No stridor. No wheezing, rhonchi or rales.   Chest:      Chest wall: No tenderness.   Musculoskeletal:         General: Normal range of motion.      Cervical back: Normal range of motion and neck supple.   Skin:     General: Skin is warm and dry.      Capillary Refill: Capillary refill takes less than 2 seconds.   Neurological:      General: No focal deficit present.      Mental  Status: She is alert and oriented to person, place, and time.   Psychiatric:         Mood and Affect: Mood normal.         Behavior: Behavior normal.         Procedures      Assessment/Plan   Allergies, medications, history, and pertinent labs/EKGs/Imaging reviewed by VENU Alvarenga.     Medical Decision Making  Patient is well appearing, afebrile, non toxic, not hypoxic, and appropriate for outpatient treatment and management at time of evaluation.     Patient presents with ongoing, worsening sinus/pain pressure and nasal congestion as described above.    Differential includes but not limited to: Sinusitis, URI, Migraine, other    Physical exam positive for severe nasal congestion and pain with palpation of the frontal and maxillary sinuses bilaterally.  History and exam consistent with acute sinusitis.     Patient was provided with a prescription for Augmentin to be used as directed.  Patient has penicillin listed as an allergy but states she has taken Augmentin in the past without complication.  She notes she only lists penicillin as an allergy because she was told she had a reaction as a child.  Red flags and ER precautions discussed. They will follow up with PCP in 2-3 days if not improving. Patient voices understanding and is agreeable to this plan. Discharged in stable condition. All questions and concerns addressed.    Orders and Diagnoses  Diagnoses and all orders for this visit:  Acute non-recurrent maxillary sinusitis  -     amoxicillin-pot clavulanate (Augmentin) 875-125 mg tablet; Take 1 tablet by mouth 2 times a day for 7 days.      Medical Admin Record      Follow Up Instructions  No follow-ups on file.    Patient disposition: Home    Electronically signed by VENU Alvarenga  10:45 AM

## 2025-03-21 ENCOUNTER — APPOINTMENT (OUTPATIENT)
Dept: PRIMARY CARE | Facility: CLINIC | Age: 70
End: 2025-03-21
Payer: COMMERCIAL

## 2025-03-26 ENCOUNTER — APPOINTMENT (OUTPATIENT)
Dept: OBSTETRICS AND GYNECOLOGY | Facility: CLINIC | Age: 70
End: 2025-03-26
Payer: COMMERCIAL

## 2025-04-17 ENCOUNTER — APPOINTMENT (OUTPATIENT)
Dept: OBSTETRICS AND GYNECOLOGY | Facility: CLINIC | Age: 70
End: 2025-04-17
Payer: COMMERCIAL

## 2025-04-17 VITALS
WEIGHT: 183 LBS | HEIGHT: 70 IN | DIASTOLIC BLOOD PRESSURE: 88 MMHG | BODY MASS INDEX: 26.2 KG/M2 | SYSTOLIC BLOOD PRESSURE: 140 MMHG

## 2025-04-17 DIAGNOSIS — M85.80 OSTEOPENIA, UNSPECIFIED LOCATION: ICD-10-CM

## 2025-04-17 DIAGNOSIS — Z12.31 SCREENING MAMMOGRAM FOR BREAST CANCER: Primary | ICD-10-CM

## 2025-04-17 DIAGNOSIS — Z01.419 WELL WOMAN EXAM WITH ROUTINE GYNECOLOGICAL EXAM: ICD-10-CM

## 2025-04-17 PROCEDURE — 1126F AMNT PAIN NOTED NONE PRSNT: CPT | Performed by: OBSTETRICS & GYNECOLOGY

## 2025-04-17 PROCEDURE — 99397 PER PM REEVAL EST PAT 65+ YR: CPT | Performed by: OBSTETRICS & GYNECOLOGY

## 2025-04-17 PROCEDURE — 3008F BODY MASS INDEX DOCD: CPT | Performed by: OBSTETRICS & GYNECOLOGY

## 2025-04-17 PROCEDURE — 1159F MED LIST DOCD IN RCRD: CPT | Performed by: OBSTETRICS & GYNECOLOGY

## 2025-04-17 RX ORDER — CLINDAMYCIN HYDROCHLORIDE 150 MG/1
150 CAPSULE ORAL 3 TIMES DAILY
COMMUNITY
Start: 2025-04-02

## 2025-04-17 ASSESSMENT — ENCOUNTER SYMPTOMS
CONSTITUTIONAL NEGATIVE: 0
RESPIRATORY NEGATIVE: 0
ALLERGIC/IMMUNOLOGIC NEGATIVE: 0
PSYCHIATRIC NEGATIVE: 0
HEMATOLOGIC/LYMPHATIC NEGATIVE: 0
DEPRESSION: 0
CARDIOVASCULAR NEGATIVE: 0
EYES NEGATIVE: 0
GASTROINTESTINAL NEGATIVE: 0
ENDOCRINE NEGATIVE: 0
NEUROLOGICAL NEGATIVE: 0
MUSCULOSKELETAL NEGATIVE: 0
OCCASIONAL FEELINGS OF UNSTEADINESS: 0
LOSS OF SENSATION IN FEET: 0

## 2025-04-17 ASSESSMENT — PAIN SCALES - GENERAL: PAINLEVEL_OUTOF10: 0-NO PAIN

## 2025-04-17 NOTE — PROGRESS NOTES
Assessment and Plan:  Fanny Choudhary is a 68 y/o  postmenopausal woman presenting today for annual GYN exam.     Diagnoses:  #1 Routine annual gynecologic exam  #2 Breast cancer screening  #3 Osteopenia    Assessment/Plan:  1. Annual GYN exam  - Additional paps not needed.  - Mammogram requisition provided.  - DEXA scan ordered.  - Return to the office in one year.  - Follow up with PCP.   - She will check on when she is due for her every 5 year colonoscopy.    Follow up with Dr. Temple.    Scribe Attestation  By signing my name below, I, KeniaEvita Pa, attest that this documentation has been prepared under the direction and in the presence of Ludivina Temple MD on 2025 at 10:12 AM.     HPI:   Fanny Choudhary is a 68 y/o  postmenopausal woman presenting today for annual GYN exam. She had a DEXA in  that was consistent with osteopenia.     Patient without any gynecologic complaints today. She reports that it takes a long time to empty her bladder, but she empties it completely. She has followed up with urogynecology in the past.    ROS:  Review of Systems    Physical Exam:   Physical Exam  Constitutional:       General: She is not in acute distress.     Appearance: Normal appearance. She is normal weight.   Genitourinary:      Vulva exam comments: Normal appearing external female genitalia, normal Bartholin's and Rena Lara's glands bilaterally  .      Vaginal exam comments: Atrophic, narrow, well supported.  .        Right Adnexa: not tender and no mass present.     Left Adnexa: not tender and no mass present.     No cervical motion tenderness.      Uterus is not enlarged, fixed or tender.      No uterine mass detected.  Breasts:     Right: No inverted nipple, mass, nipple discharge or skin change.      Left: No inverted nipple, mass, nipple discharge or skin change.   HENT:      Head: Normocephalic and atraumatic.      Right Ear: External ear normal.      Left Ear: External ear normal.       Nose: Nose normal.      Mouth/Throat:      Mouth: Mucous membranes are moist.      Pharynx: Oropharynx is clear.   Eyes:      Extraocular Movements: Extraocular movements intact.      Conjunctiva/sclera: Conjunctivae normal.      Pupils: Pupils are equal, round, and reactive to light.   Neck:      Thyroid: No thyromegaly.   Cardiovascular:      Rate and Rhythm: Normal rate and regular rhythm.      Pulses: Normal pulses.      Heart sounds: Normal heart sounds.   Pulmonary:      Effort: Pulmonary effort is normal.      Breath sounds: Normal breath sounds and air entry.   Abdominal:      Palpations: Abdomen is soft.      Tenderness: There is no abdominal tenderness.   Musculoskeletal:      Cervical back: Neck supple.   Lymphadenopathy:      Upper Body:      Right upper body: No axillary adenopathy.      Left upper body: No axillary adenopathy.   Neurological:      Mental Status: She is alert and oriented to person, place, and time.      Comments: Pleasant and cooperative

## 2025-04-17 NOTE — PATIENT INSTRUCTIONS
Thanks for coming in today for your annual GYN exam.     Your 2021 Pap was within normal limits with negative HPV testing. Additional Pap smears are not needed. However, please return to the office once a year for your annual GYN exam.     Follow-up with your primary care provider as needed.     Arrange to have a mammogram performed once a year.    Arrange to have a DEXA/bone density scan performed every 2-3 years.      Follow up with urogynecology about your bladder issues.     Feel free to call the office with any problems, questions or concerns prior to your next scheduled visit.

## 2025-05-02 ENCOUNTER — APPOINTMENT (OUTPATIENT)
Dept: PRIMARY CARE | Facility: CLINIC | Age: 70
End: 2025-05-02
Payer: COMMERCIAL

## 2025-05-02 VITALS
TEMPERATURE: 98 F | WEIGHT: 182 LBS | HEART RATE: 56 BPM | BODY MASS INDEX: 26.11 KG/M2 | SYSTOLIC BLOOD PRESSURE: 147 MMHG | DIASTOLIC BLOOD PRESSURE: 84 MMHG

## 2025-05-02 DIAGNOSIS — Z76.89 ENCOUNTER TO ESTABLISH CARE: ICD-10-CM

## 2025-05-02 DIAGNOSIS — Z13.220 SCREENING CHOLESTEROL LEVEL: Primary | ICD-10-CM

## 2025-05-02 DIAGNOSIS — Z13.1 DIABETES MELLITUS SCREENING: ICD-10-CM

## 2025-05-02 DIAGNOSIS — T78.40XS ALLERGY, SEQUELA: ICD-10-CM

## 2025-05-02 PROBLEM — T78.40XA ALLERGIES: Status: ACTIVE | Noted: 2025-05-02

## 2025-05-02 RX ORDER — FLUTICASONE PROPIONATE 50 MCG
2 SPRAY, SUSPENSION (ML) NASAL DAILY
Qty: 16 G | Refills: 2 | Status: SHIPPED | OUTPATIENT
Start: 2025-05-02

## 2025-05-02 NOTE — PROGRESS NOTES
Subjective   Patient ID: Fanny Choudhary is a 69 y.o. female who presents for Establish Care.  HPI  The patient is a 68 yo Female with a history of allergic rhinitis, HLD, IBS, here for establishment of care and for a medication refill.      Patient's problems (medical and surgical list) and medication list reviewed.     A review of system was completed.  All systems were reviewed and were normal, except for the ones that are listed in the HPI.    Objective   Physical Exam  Constitutional:       Appearance: Normal appearance.   HENT:      Head: Normocephalic and atraumatic.      Right Ear: Tympanic membrane, ear canal and external ear normal.      Left Ear: Tympanic membrane, ear canal and external ear normal.      Nose: Nose normal.      Mouth/Throat:      Mouth: Mucous membranes are moist.      Pharynx: Oropharynx is clear.   Eyes:      Extraocular Movements: Extraocular movements intact.      Conjunctiva/sclera: Conjunctivae normal.      Pupils: Pupils are equal, round, and reactive to light.   Cardiovascular:      Rate and Rhythm: Normal rate and regular rhythm.      Pulses: Normal pulses.   Pulmonary:      Effort: Pulmonary effort is normal.      Breath sounds: Normal breath sounds.   Abdominal:      General: Abdomen is flat. Bowel sounds are normal.      Palpations: Abdomen is soft.   Musculoskeletal:         General: Normal range of motion.      Cervical back: Normal range of motion and neck supple.   Skin:     General: Skin is warm.   Neurological:      General: No focal deficit present.      Mental Status: She is alert and oriented to person, place, and time. Mental status is at baseline.   Psychiatric:         Mood and Affect: Mood normal.         Behavior: Behavior normal.         Thought Content: Thought content normal.         Judgment: Judgment normal.     Assessment/Plan   Problem List Items Addressed This Visit       Allergies    -Flonase refilled today.          Relevant Medications    fluticasone  (Flonase) 50 mcg/actuation nasal spray    Other Relevant Orders    CBC    Comprehensive Metabolic Panel    TSH with reflex to Free T4 if abnormal    Screening cholesterol level - Primary    Relevant Orders    Lipid Panel    Diabetes mellitus screening    -Blood test ordered.          Relevant Orders    Hemoglobin A1C    Encounter to establish care            Patient to return to office for your  CPE.

## 2025-05-28 LAB
ALBUMIN SERPL-MCNC: 4.3 G/DL (ref 3.6–5.1)
ALP SERPL-CCNC: 77 U/L (ref 37–153)
ALT SERPL-CCNC: 16 U/L (ref 6–29)
ANION GAP SERPL CALCULATED.4IONS-SCNC: 7 MMOL/L (CALC) (ref 7–17)
AST SERPL-CCNC: 20 U/L (ref 10–35)
BILIRUB SERPL-MCNC: 0.4 MG/DL (ref 0.2–1.2)
BUN SERPL-MCNC: 14 MG/DL (ref 7–25)
CALCIUM SERPL-MCNC: 9 MG/DL (ref 8.6–10.4)
CHLORIDE SERPL-SCNC: 106 MMOL/L (ref 98–110)
CHOLEST SERPL-MCNC: 255 MG/DL
CHOLEST/HDLC SERPL: 2.7 (CALC)
CO2 SERPL-SCNC: 29 MMOL/L (ref 20–32)
CREAT SERPL-MCNC: 0.86 MG/DL (ref 0.5–1.05)
EGFRCR SERPLBLD CKD-EPI 2021: 74 ML/MIN/1.73M2
ERYTHROCYTE [DISTWIDTH] IN BLOOD BY AUTOMATED COUNT: 12.8 % (ref 11–15)
EST. AVERAGE GLUCOSE BLD GHB EST-MCNC: 123 MG/DL
EST. AVERAGE GLUCOSE BLD GHB EST-SCNC: 6.8 MMOL/L
GLUCOSE SERPL-MCNC: 83 MG/DL (ref 65–99)
HBA1C MFR BLD: 5.9 %
HCT VFR BLD AUTO: 42.7 % (ref 35–45)
HDLC SERPL-MCNC: 93 MG/DL
HGB BLD-MCNC: 13.4 G/DL (ref 11.7–15.5)
LDLC SERPL CALC-MCNC: 146 MG/DL (CALC)
MCH RBC QN AUTO: 30.7 PG (ref 27–33)
MCHC RBC AUTO-ENTMCNC: 31.4 G/DL (ref 32–36)
MCV RBC AUTO: 97.9 FL (ref 80–100)
NONHDLC SERPL-MCNC: 162 MG/DL (CALC)
PLATELET # BLD AUTO: 238 THOUSAND/UL (ref 140–400)
PMV BLD REES-ECKER: 10.8 FL (ref 7.5–12.5)
POTASSIUM SERPL-SCNC: 4.3 MMOL/L (ref 3.5–5.3)
PROT SERPL-MCNC: 6.9 G/DL (ref 6.1–8.1)
RBC # BLD AUTO: 4.36 MILLION/UL (ref 3.8–5.1)
SODIUM SERPL-SCNC: 142 MMOL/L (ref 135–146)
TRIGL SERPL-MCNC: 67 MG/DL
TSH SERPL-ACNC: 2.48 MIU/L (ref 0.4–4.5)
WBC # BLD AUTO: 4.1 THOUSAND/UL (ref 3.8–10.8)

## 2025-07-11 ENCOUNTER — TELEPHONE (OUTPATIENT)
Dept: PRIMARY CARE | Facility: CLINIC | Age: 70
End: 2025-07-11

## 2025-08-06 ENCOUNTER — APPOINTMENT (OUTPATIENT)
Dept: PRIMARY CARE | Facility: CLINIC | Age: 70
End: 2025-08-06
Payer: COMMERCIAL

## 2025-08-06 VITALS
DIASTOLIC BLOOD PRESSURE: 84 MMHG | SYSTOLIC BLOOD PRESSURE: 189 MMHG | WEIGHT: 181 LBS | HEART RATE: 82 BPM | BODY MASS INDEX: 25.97 KG/M2 | TEMPERATURE: 98 F

## 2025-08-06 DIAGNOSIS — E78.00 HYPERCHOLESTEROLEMIA: ICD-10-CM

## 2025-08-06 DIAGNOSIS — R73.09 ELEVATED HEMOGLOBIN A1C: ICD-10-CM

## 2025-08-06 DIAGNOSIS — R03.0 ELEVATED BP WITHOUT DIAGNOSIS OF HYPERTENSION: Primary | ICD-10-CM

## 2025-08-06 PROCEDURE — 1036F TOBACCO NON-USER: CPT | Performed by: FAMILY MEDICINE

## 2025-08-06 PROCEDURE — 1159F MED LIST DOCD IN RCRD: CPT | Performed by: FAMILY MEDICINE

## 2025-08-06 PROCEDURE — 99214 OFFICE O/P EST MOD 30 MIN: CPT | Performed by: FAMILY MEDICINE

## 2025-08-06 NOTE — PROGRESS NOTES
Subjective   Patient ID: Fanny Choudhary is a 69 y.o. female who presents for a follow-up visit.   HPI    The patient is a 70 yo Female with a history of allergic rhinitis, HLD, IBS, here for a follow-up visit.  Her BP is usually controlled at home according to the patient.     A review of system was completed.  All systems were reviewed and were normal, except for the ones that are listed in the HPI.    Objective   Physical Exam  Constitutional:       Appearance: Normal appearance.   HENT:      Head: Normocephalic and atraumatic.      Right Ear: Tympanic membrane, ear canal and external ear normal.      Left Ear: Tympanic membrane, ear canal and external ear normal.      Nose: Nose normal.      Mouth/Throat:      Mouth: Mucous membranes are moist.      Pharynx: Oropharynx is clear.     Eyes:      Extraocular Movements: Extraocular movements intact.      Conjunctiva/sclera: Conjunctivae normal.      Pupils: Pupils are equal, round, and reactive to light.       Cardiovascular:      Rate and Rhythm: Normal rate and regular rhythm.      Pulses: Normal pulses.   Pulmonary:      Effort: Pulmonary effort is normal.      Breath sounds: Normal breath sounds.   Abdominal:      General: Abdomen is flat. Bowel sounds are normal.      Palpations: Abdomen is soft.     Musculoskeletal:         General: Normal range of motion.      Cervical back: Normal range of motion and neck supple.     Skin:     General: Skin is warm.     Neurological:      General: No focal deficit present.      Mental Status: She is alert and oriented to person, place, and time. Mental status is at baseline.     Psychiatric:         Mood and Affect: Mood normal.         Behavior: Behavior normal.         Thought Content: Thought content normal.         Judgment: Judgment normal.     Assessment/Plan   Problem List Items Addressed This Visit       Elevated BP without diagnosis of hypertension - Primary    -Home BP monitoring recommended.  -Diet and life  style changes discussed.  -Referral to nutritionist done today.            Relevant Orders    Lipid Panel    Hemoglobin A1C    Comprehensive Metabolic Panel    Lipoprotein A (LPA)    Apolipoprotein B    Hypercholesterolemia    -Diet and life style changes discussed.  -Referral to nutritionist done today.   -Statin recommended today but declined by the patient.   -Repeat blood test in 4 months.          Relevant Orders    Lipid Panel    Lipoprotein A (LPA)    Apolipoprotein B    Elevated hemoglobin A1c    -Diet and life style changes discussed.  -Referral to nutritionist done today.   -Repeat blood test in 4 months.          Relevant Orders    Hemoglobin A1C    Patient to return to office in 4 months.

## 2025-08-06 NOTE — ASSESSMENT & PLAN NOTE
-Diet and life style changes discussed.  -Referral to nutritionist done today.   -Repeat blood test in 4 months.

## 2025-08-06 NOTE — ASSESSMENT & PLAN NOTE
-Diet and life style changes discussed.  -Referral to nutritionist done today.   -Statin recommended today but declined by the patient.   -Repeat blood test in 4 months.

## 2025-08-06 NOTE — ASSESSMENT & PLAN NOTE
-Home BP monitoring recommended.  -Diet and life style changes discussed.  -Referral to nutritionist done today.

## 2026-04-22 ENCOUNTER — APPOINTMENT (OUTPATIENT)
Dept: OBSTETRICS AND GYNECOLOGY | Facility: CLINIC | Age: 71
End: 2026-04-22
Payer: COMMERCIAL